# Patient Record
Sex: MALE | Employment: UNEMPLOYED | URBAN - METROPOLITAN AREA
[De-identification: names, ages, dates, MRNs, and addresses within clinical notes are randomized per-mention and may not be internally consistent; named-entity substitution may affect disease eponyms.]

---

## 2017-07-29 PROBLEM — R03.0 ELEVATED BLOOD PRESSURE READING: Status: ACTIVE | Noted: 2017-04-14

## 2018-05-14 ENCOUNTER — HOSPITAL ENCOUNTER (OUTPATIENT)
Age: 38
Discharge: HOME | End: 2018-05-16

## 2018-05-14 ENCOUNTER — OFFICE VISIT (OUTPATIENT)
Dept: FAMILY MEDICINE CLINIC | Age: 38
End: 2018-05-14

## 2018-05-14 VITALS
SYSTOLIC BLOOD PRESSURE: 138 MMHG | RESPIRATION RATE: 12 BRPM | WEIGHT: 215 LBS | HEART RATE: 62 BPM | HEIGHT: 65 IN | OXYGEN SATURATION: 98 % | BODY MASS INDEX: 35.82 KG/M2 | TEMPERATURE: 98.3 F | DIASTOLIC BLOOD PRESSURE: 83 MMHG

## 2018-05-14 DIAGNOSIS — E66.9 OBESITY, UNSPECIFIED CLASSIFICATION, UNSPECIFIED OBESITY TYPE, UNSPECIFIED WHETHER SERIOUS COMORBIDITY PRESENT: ICD-10-CM

## 2018-05-14 DIAGNOSIS — I10 ESSENTIAL HYPERTENSION: Primary | ICD-10-CM

## 2018-05-14 DIAGNOSIS — Z83.3 FAMILY HISTORY OF DIABETES MELLITUS: ICD-10-CM

## 2018-05-14 DIAGNOSIS — I10 ESSENTIAL HYPERTENSION: ICD-10-CM

## 2018-05-14 DIAGNOSIS — Z11.4 ENCOUNTER FOR SCREENING FOR HIV: ICD-10-CM

## 2018-05-14 LAB
ANION GAP SERPL CALCULATED.3IONS-SCNC: 16 MMOL/L (ref 7–16)
BILIRUBIN URINE: NEGATIVE
BLOOD, URINE: NEGATIVE
BUN BLDV-MCNC: 15 MG/DL (ref 6–20)
CALCIUM SERPL-MCNC: 9.3 MG/DL (ref 8.6–10.2)
CHLORIDE BLD-SCNC: 100 MMOL/L (ref 98–107)
CHOLESTEROL, TOTAL: 221 MG/DL (ref 0–199)
CLARITY: CLEAR
CO2: 22 MMOL/L (ref 22–29)
COLOR: YELLOW
CREAT SERPL-MCNC: 0.7 MG/DL (ref 0.7–1.2)
GFR AFRICAN AMERICAN: >60
GFR NON-AFRICAN AMERICAN: >60 ML/MIN/1.73
GLUCOSE BLD-MCNC: 85 MG/DL (ref 74–109)
GLUCOSE URINE: NEGATIVE MG/DL
HBA1C MFR BLD: 5.8 % (ref 4.8–5.9)
HDLC SERPL-MCNC: 37 MG/DL
KETONES, URINE: NEGATIVE MG/DL
LDL CHOLESTEROL CALCULATED: 132 MG/DL (ref 0–99)
LEUKOCYTE ESTERASE, URINE: NEGATIVE
NITRITE, URINE: NEGATIVE
PH UA: 5.5 (ref 5–9)
POTASSIUM SERPL-SCNC: 4.4 MMOL/L (ref 3.5–5)
PROTEIN UA: NEGATIVE MG/DL
SODIUM BLD-SCNC: 138 MMOL/L (ref 132–146)
SPECIFIC GRAVITY UA: 1.02 (ref 1–1.03)
TRIGL SERPL-MCNC: 258 MG/DL (ref 0–149)
TSH SERPL DL<=0.05 MIU/L-ACNC: 2.49 UIU/ML (ref 0.27–4.2)
UROBILINOGEN, URINE: 0.2 E.U./DL
VLDLC SERPL CALC-MCNC: 52 MG/DL

## 2018-05-14 PROCEDURE — 99212 OFFICE O/P EST SF 10 MIN: CPT | Performed by: FAMILY MEDICINE

## 2018-05-14 PROCEDURE — 84443 ASSAY THYROID STIM HORMONE: CPT

## 2018-05-14 PROCEDURE — 83036 HEMOGLOBIN GLYCOSYLATED A1C: CPT

## 2018-05-14 PROCEDURE — 80061 LIPID PANEL: CPT

## 2018-05-14 PROCEDURE — 80048 BASIC METABOLIC PNL TOTAL CA: CPT

## 2018-05-14 PROCEDURE — 81003 URINALYSIS AUTO W/O SCOPE: CPT

## 2018-05-14 PROCEDURE — 99213 OFFICE O/P EST LOW 20 MIN: CPT | Performed by: FAMILY MEDICINE

## 2018-05-14 PROCEDURE — 36415 COLL VENOUS BLD VENIPUNCTURE: CPT | Performed by: FAMILY MEDICINE

## 2018-05-14 ASSESSMENT — PATIENT HEALTH QUESTIONNAIRE - PHQ9
SUM OF ALL RESPONSES TO PHQ QUESTIONS 1-9: 0
1. LITTLE INTEREST OR PLEASURE IN DOING THINGS: 0
2. FEELING DOWN, DEPRESSED OR HOPELESS: 0
SUM OF ALL RESPONSES TO PHQ9 QUESTIONS 1 & 2: 0

## 2018-05-14 ASSESSMENT — ENCOUNTER SYMPTOMS
ABDOMINAL PAIN: 0
DIARRHEA: 0
NAUSEA: 0
CONSTIPATION: 0
VOMITING: 0
BLURRED VISION: 0
DOUBLE VISION: 0
SORE THROAT: 0
BACK PAIN: 0
COUGH: 0
SHORTNESS OF BREATH: 0

## 2018-10-10 ENCOUNTER — HOSPITAL ENCOUNTER (EMERGENCY)
Age: 38
Discharge: HOME | End: 2018-10-11

## 2018-10-10 DIAGNOSIS — R11.2 NON-INTRACTABLE VOMITING WITH NAUSEA, UNSPECIFIED VOMITING TYPE: Primary | ICD-10-CM

## 2018-10-10 DIAGNOSIS — R10.31 RIGHT LOWER QUADRANT ABDOMINAL PAIN: ICD-10-CM

## 2018-10-10 LAB
ALBUMIN SERPL-MCNC: 4.7 G/DL (ref 3.5–5.2)
ALP BLD-CCNC: 90 U/L (ref 40–129)
ALT SERPL-CCNC: 79 U/L (ref 0–40)
ANION GAP SERPL CALCULATED.3IONS-SCNC: 15 MMOL/L (ref 7–16)
AST SERPL-CCNC: 41 U/L (ref 0–39)
BACTERIA: NORMAL /HPF
BASOPHILS ABSOLUTE: 0.04 E9/L (ref 0–0.2)
BASOPHILS RELATIVE PERCENT: 0.3 % (ref 0–2)
BILIRUB SERPL-MCNC: 0.9 MG/DL (ref 0–1.2)
BILIRUBIN URINE: NEGATIVE
BLOOD, URINE: ABNORMAL
BUN BLDV-MCNC: 23 MG/DL (ref 6–20)
CALCIUM SERPL-MCNC: 9.8 MG/DL (ref 8.6–10.2)
CHLORIDE BLD-SCNC: 102 MMOL/L (ref 98–107)
CLARITY: CLEAR
CO2: 23 MMOL/L (ref 22–29)
COLOR: YELLOW
CREAT SERPL-MCNC: 0.7 MG/DL (ref 0.7–1.2)
EOSINOPHILS ABSOLUTE: 0.08 E9/L (ref 0.05–0.5)
EOSINOPHILS RELATIVE PERCENT: 0.7 % (ref 0–6)
EPITHELIAL CELLS, UA: NORMAL /HPF
GFR AFRICAN AMERICAN: >60
GFR NON-AFRICAN AMERICAN: >60 ML/MIN/1.73
GLUCOSE BLD-MCNC: 111 MG/DL (ref 74–109)
GLUCOSE URINE: NEGATIVE MG/DL
HCT VFR BLD CALC: 43.2 % (ref 37–54)
HEMOGLOBIN: 14.8 G/DL (ref 12.5–16.5)
IMMATURE GRANULOCYTES #: 0.05 E9/L
IMMATURE GRANULOCYTES %: 0.4 % (ref 0–5)
KETONES, URINE: NEGATIVE MG/DL
LACTIC ACID: 1 MMOL/L (ref 0.5–2.2)
LEUKOCYTE ESTERASE, URINE: NEGATIVE
LYMPHOCYTES ABSOLUTE: 1.74 E9/L (ref 1.5–4)
LYMPHOCYTES RELATIVE PERCENT: 14.7 % (ref 20–42)
MCH RBC QN AUTO: 30.3 PG (ref 26–35)
MCHC RBC AUTO-ENTMCNC: 34.3 % (ref 32–34.5)
MCV RBC AUTO: 88.5 FL (ref 80–99.9)
MONOCYTES ABSOLUTE: 0.94 E9/L (ref 0.1–0.95)
MONOCYTES RELATIVE PERCENT: 7.9 % (ref 2–12)
NEUTROPHILS ABSOLUTE: 8.99 E9/L (ref 1.8–7.3)
NEUTROPHILS RELATIVE PERCENT: 76 % (ref 43–80)
NITRITE, URINE: NEGATIVE
PDW BLD-RTO: 11.9 FL (ref 11.5–15)
PH UA: 6 (ref 5–9)
PLATELET # BLD: 175 E9/L (ref 130–450)
PMV BLD AUTO: 11.6 FL (ref 7–12)
POTASSIUM SERPL-SCNC: 3.9 MMOL/L (ref 3.5–5)
PROTEIN UA: NEGATIVE MG/DL
RBC # BLD: 4.88 E12/L (ref 3.8–5.8)
RBC UA: NORMAL /HPF (ref 0–2)
SODIUM BLD-SCNC: 140 MMOL/L (ref 132–146)
SPECIFIC GRAVITY UA: >=1.03 (ref 1–1.03)
TOTAL PROTEIN: 7.7 G/DL (ref 6.4–8.3)
UROBILINOGEN, URINE: 0.2 E.U./DL
WBC # BLD: 11.8 E9/L (ref 4.5–11.5)
WBC UA: NORMAL /HPF (ref 0–5)

## 2018-10-10 PROCEDURE — 2580000003 HC RX 258: Performed by: PHYSICIAN ASSISTANT

## 2018-10-10 PROCEDURE — 6360000002 HC RX W HCPCS: Performed by: PHYSICIAN ASSISTANT

## 2018-10-10 PROCEDURE — 96374 THER/PROPH/DIAG INJ IV PUSH: CPT

## 2018-10-10 PROCEDURE — 36415 COLL VENOUS BLD VENIPUNCTURE: CPT

## 2018-10-10 PROCEDURE — 85025 COMPLETE CBC W/AUTO DIFF WBC: CPT

## 2018-10-10 PROCEDURE — 96375 TX/PRO/DX INJ NEW DRUG ADDON: CPT

## 2018-10-10 PROCEDURE — 83605 ASSAY OF LACTIC ACID: CPT

## 2018-10-10 PROCEDURE — 80053 COMPREHEN METABOLIC PANEL: CPT

## 2018-10-10 PROCEDURE — 99284 EMERGENCY DEPT VISIT MOD MDM: CPT

## 2018-10-10 PROCEDURE — 81001 URINALYSIS AUTO W/SCOPE: CPT

## 2018-10-10 RX ORDER — MORPHINE SULFATE 4 MG/ML
4 INJECTION, SOLUTION INTRAMUSCULAR; INTRAVENOUS ONCE
Status: COMPLETED | OUTPATIENT
Start: 2018-10-10 | End: 2018-10-10

## 2018-10-10 RX ORDER — ONDANSETRON 2 MG/ML
4 INJECTION INTRAMUSCULAR; INTRAVENOUS ONCE
Status: COMPLETED | OUTPATIENT
Start: 2018-10-10 | End: 2018-10-10

## 2018-10-10 RX ORDER — 0.9 % SODIUM CHLORIDE 0.9 %
1000 INTRAVENOUS SOLUTION INTRAVENOUS ONCE
Status: COMPLETED | OUTPATIENT
Start: 2018-10-10 | End: 2018-10-11

## 2018-10-10 RX ORDER — KETOROLAC TROMETHAMINE 30 MG/ML
30 INJECTION, SOLUTION INTRAMUSCULAR; INTRAVENOUS ONCE
Status: COMPLETED | OUTPATIENT
Start: 2018-10-10 | End: 2018-10-10

## 2018-10-10 RX ADMIN — MORPHINE SULFATE 4 MG: 4 INJECTION INTRAVENOUS at 23:58

## 2018-10-10 RX ADMIN — SODIUM CHLORIDE 1000 ML: 9 INJECTION, SOLUTION INTRAVENOUS at 23:23

## 2018-10-10 RX ADMIN — KETOROLAC TROMETHAMINE 30 MG: 30 INJECTION, SOLUTION INTRAMUSCULAR; INTRAVENOUS at 23:22

## 2018-10-10 RX ADMIN — ONDANSETRON 4 MG: 2 INJECTION INTRAMUSCULAR; INTRAVENOUS at 23:22

## 2018-10-10 ASSESSMENT — PAIN SCALES - GENERAL
PAINLEVEL_OUTOF10: 10
PAINLEVEL_OUTOF10: 9
PAINLEVEL_OUTOF10: 6

## 2018-10-10 ASSESSMENT — PAIN DESCRIPTION - PAIN TYPE: TYPE: ACUTE PAIN

## 2018-10-10 ASSESSMENT — PAIN DESCRIPTION - LOCATION: LOCATION: FLANK

## 2018-10-10 ASSESSMENT — PAIN DESCRIPTION - ORIENTATION: ORIENTATION: RIGHT

## 2018-10-11 ENCOUNTER — ANESTHESIA EVENT (OUTPATIENT)
Dept: OPERATING ROOM | Age: 38
DRG: 343 | End: 2018-10-11

## 2018-10-11 ENCOUNTER — APPOINTMENT (OUTPATIENT)
Dept: CT IMAGING | Age: 38
End: 2018-10-11

## 2018-10-11 ENCOUNTER — HOSPITAL ENCOUNTER (INPATIENT)
Age: 38
LOS: 1 days | Discharge: HOME | DRG: 343 | End: 2018-10-11
Attending: SURGERY | Admitting: SURGERY

## 2018-10-11 ENCOUNTER — ANESTHESIA (OUTPATIENT)
Dept: OPERATING ROOM | Age: 38
DRG: 343 | End: 2018-10-11

## 2018-10-11 VITALS
DIASTOLIC BLOOD PRESSURE: 63 MMHG | OXYGEN SATURATION: 98 % | RESPIRATION RATE: 2 BRPM | SYSTOLIC BLOOD PRESSURE: 105 MMHG

## 2018-10-11 VITALS
BODY MASS INDEX: 29.99 KG/M2 | TEMPERATURE: 98.9 F | SYSTOLIC BLOOD PRESSURE: 112 MMHG | HEIGHT: 65 IN | RESPIRATION RATE: 16 BRPM | DIASTOLIC BLOOD PRESSURE: 59 MMHG | OXYGEN SATURATION: 94 % | WEIGHT: 180 LBS | HEART RATE: 70 BPM

## 2018-10-11 VITALS
BODY MASS INDEX: 29.99 KG/M2 | OXYGEN SATURATION: 93 % | HEIGHT: 65 IN | TEMPERATURE: 98.6 F | WEIGHT: 180 LBS | RESPIRATION RATE: 16 BRPM | DIASTOLIC BLOOD PRESSURE: 58 MMHG | SYSTOLIC BLOOD PRESSURE: 111 MMHG | HEART RATE: 55 BPM

## 2018-10-11 DIAGNOSIS — K35.80 ACUTE APPENDICITIS, UNSPECIFIED ACUTE APPENDICITIS TYPE: Primary | ICD-10-CM

## 2018-10-11 PROBLEM — K37 APPENDICITIS: Status: ACTIVE | Noted: 2018-10-11

## 2018-10-11 PROCEDURE — 0DTJ4ZZ RESECTION OF APPENDIX, PERCUTANEOUS ENDOSCOPIC APPROACH: ICD-10-PCS | Performed by: SURGERY

## 2018-10-11 PROCEDURE — 6360000002 HC RX W HCPCS

## 2018-10-11 PROCEDURE — 6360000002 HC RX W HCPCS: Performed by: SURGERY

## 2018-10-11 PROCEDURE — 7100000001 HC PACU RECOVERY - ADDTL 15 MIN: Performed by: SURGERY

## 2018-10-11 PROCEDURE — 44970 LAPAROSCOPY APPENDECTOMY: CPT | Performed by: SURGERY

## 2018-10-11 PROCEDURE — 88304 TISSUE EXAM BY PATHOLOGIST: CPT

## 2018-10-11 PROCEDURE — 2780000010 HC IMPLANT OTHER: Performed by: SURGERY

## 2018-10-11 PROCEDURE — 6360000002 HC RX W HCPCS: Performed by: NURSE ANESTHETIST, CERTIFIED REGISTERED

## 2018-10-11 PROCEDURE — 6360000002 HC RX W HCPCS: Performed by: ANESTHESIOLOGY

## 2018-10-11 PROCEDURE — 99284 EMERGENCY DEPT VISIT MOD MDM: CPT

## 2018-10-11 PROCEDURE — 74178 CT ABD&PLV WO CNTR FLWD CNTR: CPT

## 2018-10-11 PROCEDURE — 99219 PR INITIAL OBSERVATION CARE/DAY 50 MINUTES: CPT | Performed by: SURGERY

## 2018-10-11 PROCEDURE — 6370000000 HC RX 637 (ALT 250 FOR IP): Performed by: SURGERY

## 2018-10-11 PROCEDURE — 2500000003 HC RX 250 WO HCPCS: Performed by: SURGERY

## 2018-10-11 PROCEDURE — 3600000004 HC SURGERY LEVEL 4 BASE: Performed by: SURGERY

## 2018-10-11 PROCEDURE — 6360000004 HC RX CONTRAST MEDICATION: Performed by: RADIOLOGY

## 2018-10-11 PROCEDURE — 3600000014 HC SURGERY LEVEL 4 ADDTL 15MIN: Performed by: SURGERY

## 2018-10-11 PROCEDURE — 2500000003 HC RX 250 WO HCPCS

## 2018-10-11 PROCEDURE — 7100000000 HC PACU RECOVERY - FIRST 15 MIN: Performed by: SURGERY

## 2018-10-11 PROCEDURE — 2500000003 HC RX 250 WO HCPCS: Performed by: NURSE ANESTHETIST, CERTIFIED REGISTERED

## 2018-10-11 PROCEDURE — 2580000003 HC RX 258: Performed by: SURGERY

## 2018-10-11 PROCEDURE — 1200000000 HC SEMI PRIVATE

## 2018-10-11 PROCEDURE — 2720000010 HC SURG SUPPLY STERILE: Performed by: SURGERY

## 2018-10-11 PROCEDURE — 3700000001 HC ADD 15 MINUTES (ANESTHESIA): Performed by: SURGERY

## 2018-10-11 PROCEDURE — 3700000000 HC ANESTHESIA ATTENDED CARE: Performed by: SURGERY

## 2018-10-11 PROCEDURE — 2580000003 HC RX 258: Performed by: NURSE ANESTHETIST, CERTIFIED REGISTERED

## 2018-10-11 PROCEDURE — 2709999900 HC NON-CHARGEABLE SUPPLY: Performed by: SURGERY

## 2018-10-11 RX ORDER — MIDAZOLAM HYDROCHLORIDE 1 MG/ML
INJECTION INTRAMUSCULAR; INTRAVENOUS PRN
Status: DISCONTINUED | OUTPATIENT
Start: 2018-10-11 | End: 2018-10-11 | Stop reason: SDUPTHER

## 2018-10-11 RX ORDER — OXYCODONE HYDROCHLORIDE AND ACETAMINOPHEN 5; 325 MG/1; MG/1
1 TABLET ORAL EVERY 4 HOURS PRN
Status: DISCONTINUED | OUTPATIENT
Start: 2018-10-11 | End: 2018-10-11 | Stop reason: HOSPADM

## 2018-10-11 RX ORDER — MORPHINE SULFATE 4 MG/ML
2 INJECTION, SOLUTION INTRAMUSCULAR; INTRAVENOUS
Status: DISCONTINUED | OUTPATIENT
Start: 2018-10-11 | End: 2018-10-11 | Stop reason: HOSPADM

## 2018-10-11 RX ORDER — FENTANYL CITRATE 50 UG/ML
25 INJECTION, SOLUTION INTRAMUSCULAR; INTRAVENOUS EVERY 5 MIN PRN
Status: COMPLETED | OUTPATIENT
Start: 2018-10-11 | End: 2018-10-11

## 2018-10-11 RX ORDER — BUPIVACAINE HYDROCHLORIDE AND EPINEPHRINE 2.5; 5 MG/ML; UG/ML
INJECTION, SOLUTION EPIDURAL; INFILTRATION; INTRACAUDAL; PERINEURAL PRN
Status: DISCONTINUED | OUTPATIENT
Start: 2018-10-11 | End: 2018-10-11 | Stop reason: HOSPADM

## 2018-10-11 RX ORDER — MORPHINE SULFATE 4 MG/ML
4 INJECTION, SOLUTION INTRAMUSCULAR; INTRAVENOUS
Status: DISCONTINUED | OUTPATIENT
Start: 2018-10-11 | End: 2018-10-11 | Stop reason: HOSPADM

## 2018-10-11 RX ORDER — GLYCOPYRROLATE 1 MG/5 ML
SYRINGE (ML) INTRAVENOUS PRN
Status: DISCONTINUED | OUTPATIENT
Start: 2018-10-11 | End: 2018-10-11 | Stop reason: SDUPTHER

## 2018-10-11 RX ORDER — CIPROFLOXACIN 2 MG/ML
400 INJECTION, SOLUTION INTRAVENOUS EVERY 12 HOURS
Status: DISCONTINUED | OUTPATIENT
Start: 2018-10-11 | End: 2018-10-11

## 2018-10-11 RX ORDER — DICYCLOMINE HYDROCHLORIDE 10 MG/1
20 CAPSULE ORAL
Qty: 15 CAPSULE | Refills: 0 | Status: SHIPPED | OUTPATIENT
Start: 2018-10-11 | End: 2018-10-11 | Stop reason: ALTCHOICE

## 2018-10-11 RX ORDER — FENTANYL CITRATE 50 UG/ML
50 INJECTION, SOLUTION INTRAMUSCULAR; INTRAVENOUS EVERY 5 MIN PRN
Status: DISCONTINUED | OUTPATIENT
Start: 2018-10-11 | End: 2018-10-11 | Stop reason: HOSPADM

## 2018-10-11 RX ORDER — SODIUM CHLORIDE, SODIUM LACTATE, POTASSIUM CHLORIDE, CALCIUM CHLORIDE 600; 310; 30; 20 MG/100ML; MG/100ML; MG/100ML; MG/100ML
INJECTION, SOLUTION INTRAVENOUS CONTINUOUS
Status: DISCONTINUED | OUTPATIENT
Start: 2018-10-11 | End: 2018-10-11 | Stop reason: HOSPADM

## 2018-10-11 RX ORDER — ONDANSETRON 4 MG/1
4 TABLET, ORALLY DISINTEGRATING ORAL EVERY 8 HOURS PRN
Qty: 10 TABLET | Refills: 0 | Status: SHIPPED | OUTPATIENT
Start: 2018-10-11 | End: 2018-10-11 | Stop reason: ALTCHOICE

## 2018-10-11 RX ORDER — SODIUM CHLORIDE 0.9 % (FLUSH) 0.9 %
10 SYRINGE (ML) INJECTION PRN
Status: DISCONTINUED | OUTPATIENT
Start: 2018-10-11 | End: 2018-10-11 | Stop reason: HOSPADM

## 2018-10-11 RX ORDER — CIPROFLOXACIN 2 MG/ML
INJECTION, SOLUTION INTRAVENOUS
Status: COMPLETED
Start: 2018-10-11 | End: 2018-10-11

## 2018-10-11 RX ORDER — IBUPROFEN 800 MG/1
800 TABLET ORAL EVERY 6 HOURS PRN
Qty: 20 TABLET | Refills: 0 | Status: SHIPPED | OUTPATIENT
Start: 2018-10-11 | End: 2022-07-28

## 2018-10-11 RX ORDER — DEXAMETHASONE SODIUM PHOSPHATE 10 MG/ML
INJECTION INTRAMUSCULAR; INTRAVENOUS PRN
Status: DISCONTINUED | OUTPATIENT
Start: 2018-10-11 | End: 2018-10-11 | Stop reason: SDUPTHER

## 2018-10-11 RX ORDER — OXYCODONE HYDROCHLORIDE AND ACETAMINOPHEN 5; 325 MG/1; MG/1
1 TABLET ORAL EVERY 6 HOURS PRN
Qty: 20 TABLET | Refills: 0 | Status: SHIPPED | OUTPATIENT
Start: 2018-10-11 | End: 2018-10-16

## 2018-10-11 RX ORDER — SODIUM CHLORIDE 0.9 % (FLUSH) 0.9 %
10 SYRINGE (ML) INJECTION EVERY 12 HOURS SCHEDULED
Status: DISCONTINUED | OUTPATIENT
Start: 2018-10-11 | End: 2018-10-11 | Stop reason: HOSPADM

## 2018-10-11 RX ORDER — ONDANSETRON 2 MG/ML
4 INJECTION INTRAMUSCULAR; INTRAVENOUS EVERY 6 HOURS PRN
Status: DISCONTINUED | OUTPATIENT
Start: 2018-10-11 | End: 2018-10-11 | Stop reason: HOSPADM

## 2018-10-11 RX ORDER — ONDANSETRON 2 MG/ML
4 INJECTION INTRAMUSCULAR; INTRAVENOUS
Status: DISCONTINUED | OUTPATIENT
Start: 2018-10-11 | End: 2018-10-11 | Stop reason: HOSPADM

## 2018-10-11 RX ORDER — SODIUM CHLORIDE, SODIUM LACTATE, POTASSIUM CHLORIDE, CALCIUM CHLORIDE 600; 310; 30; 20 MG/100ML; MG/100ML; MG/100ML; MG/100ML
INJECTION, SOLUTION INTRAVENOUS CONTINUOUS PRN
Status: DISCONTINUED | OUTPATIENT
Start: 2018-10-11 | End: 2018-10-11 | Stop reason: SDUPTHER

## 2018-10-11 RX ORDER — KETOROLAC TROMETHAMINE 30 MG/ML
INJECTION, SOLUTION INTRAMUSCULAR; INTRAVENOUS PRN
Status: DISCONTINUED | OUTPATIENT
Start: 2018-10-11 | End: 2018-10-11 | Stop reason: SDUPTHER

## 2018-10-11 RX ORDER — KETOROLAC TROMETHAMINE 30 MG/ML
30 INJECTION, SOLUTION INTRAMUSCULAR; INTRAVENOUS EVERY 6 HOURS PRN
Status: DISCONTINUED | OUTPATIENT
Start: 2018-10-11 | End: 2018-10-11 | Stop reason: HOSPADM

## 2018-10-11 RX ORDER — ACETAMINOPHEN 325 MG/1
650 TABLET ORAL EVERY 4 HOURS PRN
Status: DISCONTINUED | OUTPATIENT
Start: 2018-10-11 | End: 2018-10-11 | Stop reason: HOSPADM

## 2018-10-11 RX ORDER — FENTANYL CITRATE 50 UG/ML
INJECTION, SOLUTION INTRAMUSCULAR; INTRAVENOUS
Status: COMPLETED
Start: 2018-10-11 | End: 2018-10-11

## 2018-10-11 RX ORDER — PROPOFOL 10 MG/ML
INJECTION, EMULSION INTRAVENOUS PRN
Status: DISCONTINUED | OUTPATIENT
Start: 2018-10-11 | End: 2018-10-11 | Stop reason: SDUPTHER

## 2018-10-11 RX ORDER — ROCURONIUM BROMIDE 10 MG/ML
INJECTION, SOLUTION INTRAVENOUS PRN
Status: DISCONTINUED | OUTPATIENT
Start: 2018-10-11 | End: 2018-10-11 | Stop reason: SDUPTHER

## 2018-10-11 RX ADMIN — FENTANYL CITRATE 25 MCG: 50 INJECTION INTRAMUSCULAR; INTRAVENOUS at 14:03

## 2018-10-11 RX ADMIN — FENTANYL CITRATE 25 MCG: 50 INJECTION INTRAMUSCULAR; INTRAVENOUS at 13:55

## 2018-10-11 RX ADMIN — SODIUM CHLORIDE, POTASSIUM CHLORIDE, SODIUM LACTATE AND CALCIUM CHLORIDE: 600; 310; 30; 20 INJECTION, SOLUTION INTRAVENOUS at 15:53

## 2018-10-11 RX ADMIN — METRONIDAZOLE 500 MG: 500 INJECTION, SOLUTION INTRAVENOUS at 11:38

## 2018-10-11 RX ADMIN — OXYCODONE HYDROCHLORIDE AND ACETAMINOPHEN 1 TABLET: 5; 325 TABLET ORAL at 19:17

## 2018-10-11 RX ADMIN — FENTANYL CITRATE 25 MCG: 50 INJECTION INTRAMUSCULAR; INTRAVENOUS at 14:08

## 2018-10-11 RX ADMIN — OXYCODONE HYDROCHLORIDE AND ACETAMINOPHEN 1 TABLET: 5; 325 TABLET ORAL at 15:51

## 2018-10-11 RX ADMIN — ROCURONIUM BROMIDE 30 MG: 10 INJECTION, SOLUTION INTRAVENOUS at 12:57

## 2018-10-11 RX ADMIN — MIDAZOLAM 2 MG: 1 INJECTION INTRAMUSCULAR; INTRAVENOUS at 12:52

## 2018-10-11 RX ADMIN — FENTANYL CITRATE 100 MCG: 50 INJECTION, SOLUTION INTRAMUSCULAR; INTRAVENOUS at 12:56

## 2018-10-11 RX ADMIN — FENTANYL CITRATE 25 MCG: 50 INJECTION INTRAMUSCULAR; INTRAVENOUS at 13:50

## 2018-10-11 RX ADMIN — ONDANSETRON 4 MG: 2 INJECTION INTRAMUSCULAR; INTRAVENOUS at 13:11

## 2018-10-11 RX ADMIN — SODIUM CHLORIDE, POTASSIUM CHLORIDE, SODIUM LACTATE AND CALCIUM CHLORIDE: 600; 310; 30; 20 INJECTION, SOLUTION INTRAVENOUS at 12:50

## 2018-10-11 RX ADMIN — KETOROLAC TROMETHAMINE 30 MG: 30 INJECTION, SOLUTION INTRAMUSCULAR; INTRAVENOUS at 13:20

## 2018-10-11 RX ADMIN — MORPHINE SULFATE 4 MG: 4 INJECTION INTRAVENOUS at 16:38

## 2018-10-11 RX ADMIN — Medication 0.2 MG: at 13:09

## 2018-10-11 RX ADMIN — DEXAMETHASONE SODIUM PHOSPHATE 10 MG: 10 INJECTION INTRAMUSCULAR; INTRAVENOUS at 13:08

## 2018-10-11 RX ADMIN — PROPOFOL 200 MG: 10 INJECTION, EMULSION INTRAVENOUS at 12:56

## 2018-10-11 RX ADMIN — IOPAMIDOL 80 ML: 755 INJECTION, SOLUTION INTRAVENOUS at 00:27

## 2018-10-11 RX ADMIN — SODIUM CHLORIDE, POTASSIUM CHLORIDE, SODIUM LACTATE AND CALCIUM CHLORIDE: 600; 310; 30; 20 INJECTION, SOLUTION INTRAVENOUS at 13:36

## 2018-10-11 RX ADMIN — CIPROFLOXACIN 400 MG: 2 INJECTION, SOLUTION INTRAVENOUS at 13:03

## 2018-10-11 ASSESSMENT — PULMONARY FUNCTION TESTS
PIF_VALUE: 25
PIF_VALUE: 28
PIF_VALUE: 41
PIF_VALUE: 25
PIF_VALUE: 28
PIF_VALUE: 27
PIF_VALUE: 19
PIF_VALUE: 3
PIF_VALUE: 19
PIF_VALUE: 28
PIF_VALUE: 5
PIF_VALUE: 20
PIF_VALUE: 28
PIF_VALUE: 39
PIF_VALUE: 22
PIF_VALUE: 27
PIF_VALUE: 7
PIF_VALUE: 27
PIF_VALUE: 27
PIF_VALUE: 7
PIF_VALUE: 28
PIF_VALUE: 21
PIF_VALUE: 26
PIF_VALUE: 20
PIF_VALUE: 29
PIF_VALUE: 27
PIF_VALUE: 7
PIF_VALUE: 27
PIF_VALUE: 22
PIF_VALUE: 28
PIF_VALUE: 3
PIF_VALUE: 20
PIF_VALUE: 32
PIF_VALUE: 5
PIF_VALUE: 22

## 2018-10-11 ASSESSMENT — PAIN DESCRIPTION - LOCATION
LOCATION: ABDOMEN

## 2018-10-11 ASSESSMENT — PAIN SCALES - GENERAL
PAINLEVEL_OUTOF10: 0
PAINLEVEL_OUTOF10: 6
PAINLEVEL_OUTOF10: 5
PAINLEVEL_OUTOF10: 4
PAINLEVEL_OUTOF10: 5
PAINLEVEL_OUTOF10: 2
PAINLEVEL_OUTOF10: 10
PAINLEVEL_OUTOF10: 3
PAINLEVEL_OUTOF10: 7
PAINLEVEL_OUTOF10: 4
PAINLEVEL_OUTOF10: 7

## 2018-10-11 ASSESSMENT — PAIN DESCRIPTION - PAIN TYPE
TYPE: ACUTE PAIN
TYPE: SURGICAL PAIN
TYPE: ACUTE PAIN
TYPE: SURGICAL PAIN

## 2018-10-11 ASSESSMENT — PAIN DESCRIPTION - ORIENTATION
ORIENTATION: RIGHT;MID
ORIENTATION: RIGHT;LEFT
ORIENTATION: LOWER;RIGHT

## 2018-10-11 ASSESSMENT — LIFESTYLE VARIABLES: SMOKING_STATUS: 1

## 2018-10-11 ASSESSMENT — PAIN DESCRIPTION - DESCRIPTORS
DESCRIPTORS: ACHING;BURNING
DESCRIPTORS: ACHING
DESCRIPTORS: ACHING;BURNING

## 2018-10-11 ASSESSMENT — PAIN DESCRIPTION - PROGRESSION
CLINICAL_PROGRESSION: NOT CHANGED
CLINICAL_PROGRESSION: GRADUALLY IMPROVING

## 2018-10-11 NOTE — OP NOTE
between the mesoappendix and the appendix itself. We then used a white load on the 45mm endostapler to transect the mesoappendix. A 45mm endostapler blue load was then used to staple across the base of the appendix, and it was transected. Both of these were done without difficulty, and they were done under direct visualization and with laparoscopic camera. At this point we placed the appendix in a laparoscopic bag and it was retracted through the 10 mm port. We then inspected the base of the cecum which appeared to be hemostatic. We then withdrew our ports under direct visualization, inspected them, and they appeared to be hemostatic. We decompressed the abdomen and retracted our 5 mm camera and our 10 mm port. We then placed a single figure-of-8 interrupted 0 Vicryl suture in the 10 mm port site to reapproximate the fascia, and then close the skin with 4-0 Monocryl suture in a simple interrupted fashion. All 3 port sites were then covered with Dermabond. The patient was awoken in the operating room theater without any difficulty. The patient was extubated without difficulty and transferred to the postoperative care unit. All instrument counts, lap counts, and needle counts were correct at the completion of the procedure.           Electronically signed by Mario Black  10/11/2018  1:00 PM

## 2018-10-11 NOTE — ANESTHESIA PRE PROCEDURE
Department of Anesthesiology  Preprocedure Note       Name:  Sami Olivia   Age:  43 y.o.  :  1980                                          MRN:  [id number]         Date:  10/11/2018      Surgeon: Noy Cerna):  Marylee Ceo, MD    Procedure: Procedure(s):  LAPAROSCOPIC APPENDECTOMY    Medications prior to admission:   Prior to Admission medications    Not on File       Current medications:    Current Facility-Administered Medications   Medication Dose Route Frequency Provider Last Rate Last Dose    sodium chloride flush 0.9 % injection 10 mL  10 mL Intravenous 2 times per day Marylee Ceo, MD        sodium chloride flush 0.9 % injection 10 mL  10 mL Intravenous PRN Marylee Ceo, MD        acetaminophen (TYLENOL) tablet 650 mg  650 mg Oral Q4H PRN Marylee Ceo, MD        ondansetron [facility]) injection 4 mg  4 mg Intravenous Q6H PRN Marylee Ceo, MD  Jacquelynne Inch ON 10/12/2018] enoxaparin (LOVENOX) injection 40 mg  40 mg Subcutaneous Daily Marylee Ceo, MD        lactated ringers infusion   Intravenous Continuous Marylee Ceo, MD        morphine (PF) injection 2 mg  2 mg Intravenous Q2H PRN Marylee Ceo, MD        Or    morphine (PF) injection 4 mg  4 mg Intravenous Q2H PRN Marylee Ceo, MD        ciprofloxacin (CIPRO) IVPB 400 mg  400 mg Intravenous Q12H Marylee Ceo, MD        metronidazole (FLAGYL) 500 mg in NaCl 100 mL IVPB premix  500 mg Intravenous Q8H Marylee Ceo, MD           Allergies:  No Known Allergies    Problem List:    Patient Active Problem List   Diagnosis Code    Appendicitis K37       Past Medical History:  History reviewed. No pertinent past medical history.     Past Surgical History:        Procedure Laterality Date    ANTERIOR CRUCIATE LIGAMENT REPAIR Right     Right knee       Social History:    Social History   Substance Use Topics    Smoking status: Current Some Day Smoker     Types: Cigarettes    Smokeless tobacco: Never Used    Alcohol

## 2018-10-11 NOTE — H&P
Carine Power  10/11/2018  [facility]  History and Physical  Florentino Diego MD, MS    CHIEF COMPLAINT: Right lower abdominal pain. HISTORY OF PRESENT ILLNESS: Carine Power is a  43 y.o.  male, who weighs Weight: 180 lb (81.6 kg). He has been having lower abdominal pain for 1 days. Labs show the WBC to be 11.8. CT of the abdomen and pelvis showed acute appendicitis. We also discussed the different  medical and surgical options. He wishes to proceed with appendectomy. He states that he understands all of these risks and wishes to proceed. Patient has no known allergies. History reviewed. No pertinent past medical history.    Past Surgical History:   Procedure Laterality Date    ANTERIOR CRUCIATE LIGAMENT REPAIR Right     Right knee        Current Facility-Administered Medications   Medication Dose Route Frequency Provider Last Rate Last Dose    sodium chloride flush 0.9 % injection 10 mL  10 mL Intravenous 2 times per day Manolo Milligan MD        sodium chloride flush 0.9 % injection 10 mL  10 mL Intravenous PRN Manolo Milligan MD        acetaminophen (TYLENOL) tablet 650 mg  650 mg Oral Q4H PRN Manolo Milligan MD        ondansetron [facility]) injection 4 mg  4 mg Intravenous Q6H PRN MD Kathleen Rocha ON 10/12/2018] enoxaparin (LOVENOX) injection 40 mg  40 mg Subcutaneous Daily Manolo Milligan MD        lactated ringers infusion   Intravenous Continuous Manolo Milligan MD        morphine (PF) injection 2 mg  2 mg Intravenous Q2H PRN Manolo Milligan MD        Or    morphine (PF) injection 4 mg  4 mg Intravenous Q2H PRN Manolo Milligan MD        ciprofloxacin (CIPRO) IVPB 400 mg  400 mg Intravenous Q12H Manolo Milligan MD        metronidazole (FLAGYL) 500 mg in NaCl 100 mL IVPB premix  500 mg Intravenous Bill Coyne  mL/hr at 10/11/18 1138 500 mg at 10/11/18 1138    fentaNYL (SUBLIMAZE) injection 25 mcg  25

## 2018-10-11 NOTE — ED NOTES
Discharge, medications, and follow-up instructions reviewed with patient. Pt questions/concerns answered. Pt verbalized understanding.         Yao Lovett RN  10/11/18 [id number]

## 2018-10-11 NOTE — ED PROVIDER NOTES
ED Attending  CC: No    HPI:  10/11/18,   Time: 10:02 AM         Eduardo Coulter is a 43 y.o. male presenting to the ED for RLQ pain, beginning 1 day ago. The complaint has been intermittent, moderate in severity, and worsened by nothing. Mr. Clarita Escalante was seen here overnight for right lower quadrant pain by Dr. Molly Brizuela. He states this pain began around 1 PM yesterday afternoon. He had associated nausea and vomiting at the time as well. Workup here in the emergency room included labs fluids and a CAT scan of the Abd/pelvis. On preliminary reading the report indicated no acute findings, however on reread this morning by our radiologists they did feel that there was stranding and inflammatory changes to suggest early appendicitis. Patient was advised to come back to the emergency room for further evaluation. He is telling me that he still has pain in his right lower quadrant. He's been nauseated but has not had any emesis this morning. He's had only a few sips of water and nothing else to eat this morning. ROS:     Constitutional: Negative for fever and chills  HENT: Negative for ear pain, sore throat and sinus pressure  Eyes: Negative for pain, discharge and redness  Respiratory:  Negative for shortness of breath, cough and wheezing  Cardiovascular: Negative for CP, edema or palpitations  Gastrointestinal: See HPI  Genitourinary: Negative for dysuria and frequency  Musculoskeletal: Negative for back pain and arthralgia  Skin: Negative for rash and wound  Neurological: Negative for weakness and headaches  Hematological: Negative for adenopathy    All other systems reviewed and are negative      --------------------------------------------- PAST HISTORY ---------------------------------------------  Past Medical History:  has no past medical history on file. Past Surgical History:  has a past surgical history that includes Anterior cruciate ligament repair (Right).     Social History:  reports that he has

## 2018-10-29 ENCOUNTER — OFFICE VISIT (OUTPATIENT)
Dept: SURGERY | Age: 38
End: 2018-10-29

## 2018-10-29 VITALS — SYSTOLIC BLOOD PRESSURE: 132 MMHG | HEART RATE: 80 BPM | DIASTOLIC BLOOD PRESSURE: 84 MMHG

## 2018-10-29 DIAGNOSIS — Z90.49 S/P LAPAROSCOPIC APPENDECTOMY: Primary | ICD-10-CM

## 2018-10-29 PROCEDURE — 99024 POSTOP FOLLOW-UP VISIT: CPT | Performed by: SURGERY

## 2022-07-28 ENCOUNTER — OFFICE VISIT (OUTPATIENT)
Dept: FAMILY MEDICINE CLINIC | Age: 42
End: 2022-07-28

## 2022-07-28 VITALS
BODY MASS INDEX: 35.49 KG/M2 | HEIGHT: 65 IN | DIASTOLIC BLOOD PRESSURE: 77 MMHG | TEMPERATURE: 98 F | OXYGEN SATURATION: 96 % | SYSTOLIC BLOOD PRESSURE: 127 MMHG | RESPIRATION RATE: 18 BRPM | WEIGHT: 213 LBS | HEART RATE: 58 BPM

## 2022-07-28 DIAGNOSIS — R73.03 PREDIABETES: Primary | ICD-10-CM

## 2022-07-28 DIAGNOSIS — R30.0 DYSURIA: ICD-10-CM

## 2022-07-28 LAB
BACTERIA: ABNORMAL /HPF
BILIRUBIN URINE: NEGATIVE
BILIRUBIN, POC: NEGATIVE
BLOOD URINE, POC: NORMAL
BLOOD, URINE: NORMAL
CLARITY, POC: CLEAR
CLARITY: CLEAR
COLOR, POC: NORMAL
COLOR: YELLOW
EPITHELIAL CELLS, UA: ABNORMAL /HPF
GLUCOSE URINE, POC: NEGATIVE
GLUCOSE URINE: NEGATIVE MG/DL
KETONES, POC: NEGATIVE
KETONES, URINE: NEGATIVE MG/DL
LEUKOCYTE EST, POC: NORMAL
LEUKOCYTE ESTERASE, URINE: NEGATIVE
MUCUS: PRESENT /LPF
NITRITE, POC: NEGATIVE
NITRITE, URINE: NEGATIVE
PH UA: 6 (ref 5–9)
PH, POC: 5.5
PROTEIN UA: NEGATIVE MG/DL
PROTEIN, POC: NEGATIVE
RBC UA: ABNORMAL /HPF (ref 0–2)
SPECIFIC GRAVITY UA: 1.02 (ref 1–1.03)
SPECIFIC GRAVITY, POC: 1.03
UROBILINOGEN, POC: 0.2
UROBILINOGEN, URINE: 0.2 E.U./DL
WBC UA: ABNORMAL /HPF (ref 0–5)

## 2022-07-28 PROCEDURE — 99203 OFFICE O/P NEW LOW 30 MIN: CPT | Performed by: FAMILY MEDICINE

## 2022-07-28 PROCEDURE — 81002 URINALYSIS NONAUTO W/O SCOPE: CPT | Performed by: FAMILY MEDICINE

## 2022-07-28 PROCEDURE — 99213 OFFICE O/P EST LOW 20 MIN: CPT | Performed by: FAMILY MEDICINE

## 2022-07-28 SDOH — ECONOMIC STABILITY: TRANSPORTATION INSECURITY
IN THE PAST 12 MONTHS, HAS LACK OF TRANSPORTATION KEPT YOU FROM MEETINGS, WORK, OR FROM GETTING THINGS NEEDED FOR DAILY LIVING?: NO

## 2022-07-28 SDOH — ECONOMIC STABILITY: FOOD INSECURITY: WITHIN THE PAST 12 MONTHS, YOU WORRIED THAT YOUR FOOD WOULD RUN OUT BEFORE YOU GOT MONEY TO BUY MORE.: NEVER TRUE

## 2022-07-28 SDOH — ECONOMIC STABILITY: FOOD INSECURITY: WITHIN THE PAST 12 MONTHS, THE FOOD YOU BOUGHT JUST DIDN'T LAST AND YOU DIDN'T HAVE MONEY TO GET MORE.: NEVER TRUE

## 2022-07-28 SDOH — ECONOMIC STABILITY: TRANSPORTATION INSECURITY
IN THE PAST 12 MONTHS, HAS THE LACK OF TRANSPORTATION KEPT YOU FROM MEDICAL APPOINTMENTS OR FROM GETTING MEDICATIONS?: NO

## 2022-07-28 ASSESSMENT — PATIENT HEALTH QUESTIONNAIRE - PHQ9
SUM OF ALL RESPONSES TO PHQ9 QUESTIONS 1 & 2: 0
1. LITTLE INTEREST OR PLEASURE IN DOING THINGS: 0
2. FEELING DOWN, DEPRESSED OR HOPELESS: 0
SUM OF ALL RESPONSES TO PHQ QUESTIONS 1-9: 0

## 2022-07-28 ASSESSMENT — LIFESTYLE VARIABLES
HOW OFTEN DO YOU HAVE A DRINK CONTAINING ALCOHOL: MONTHLY OR LESS
HOW MANY STANDARD DRINKS CONTAINING ALCOHOL DO YOU HAVE ON A TYPICAL DAY: 1 OR 2

## 2022-07-28 ASSESSMENT — SOCIAL DETERMINANTS OF HEALTH (SDOH): HOW HARD IS IT FOR YOU TO PAY FOR THE VERY BASICS LIKE FOOD, HOUSING, MEDICAL CARE, AND HEATING?: NOT HARD AT ALL

## 2022-07-28 NOTE — ASSESSMENT & PLAN NOTE
DDx: UTI vs Stone vs STI. Given resolution of symptoms, do not think needs a CT at this time. No sign of obstruction. Would recommend completing labs, if recurs, consider ER visit.

## 2022-07-28 NOTE — ASSESSMENT & PLAN NOTE
a1c elevated 2018  Recheck a1c  rec weight loss. Uninsured so will hold off on labs now per patient.

## 2022-07-28 NOTE — PROGRESS NOTES
Omar Villegas is a 36 y.o. male who presents today for   Chief Complaint   Patient presents with    Establish Care     Had burning on urination a few days ago       HPI: 45yo male here to re-establish Reports 3 days of pain w/ burning with urination that lasted all day 3 days ago. He has been urinating OK since then without any pain. He did a notice a different colored tint to the urine but did not seen blood. He didn't see any other symptoms with it. He reports some vague abdominal discomfort with it, no back/flank pain. Pain 7/10. Drank increased fluids that seemed to have helped. Doesn't report being dehydrated. Reports having in the past and was thought to be kidney stones. Did recall a small amount of solid similar to a seed that he passed and then noticed blood tinge after that. Past Medical History:   Diagnosis Date    ACL injury tear     2004       No current outpatient medications    No Known Allergies    Family History   Problem Relation Age of Onset    Asthma Mother     Diabetes Mother     No Known Problems Sister     No Known Problems Brother     No Known Problems Brother     No Known Problems Brother     High Blood Pressure Mother     High Cholesterol Mother     Diabetes Father        Past Surgical History:   Procedure Laterality Date    ANTERIOR CRUCIATE LIGAMENT REPAIR      Dr Georgina Arreaga Right     Right knee    ME LAP,APPENDECTOMY N/A 10/11/2018    LAPAROSCOPIC APPENDECTOMY performed by Rick Mir MD at [address] History     Tobacco Use    Smoking status: Some Days     Types: Cigarettes    Smokeless tobacco: Never   Substance Use Topics    Alcohol use: Yes     Comment: ocassionally    Drug use: No     Types: Marijuana (Weed)     Comment: back in high school       ROS:     No fevers / chills / shivering / sweats. No temperatures. No cough/shortness of breath.  + dysuria. No constipation. Now Urinating and stooling without problem. Physical Exam    Gen: NAD, AAOX3  HEENT: NC/AT, EOMI / PERRL / ANICTERIC, No Facial Droop. OP Clear, No obvious thyromegaly. CV: NDPMI, RRR no MRG  RESP: no IWOB, ctab. no w/r. ABD: +BS, SOFT, NT, ND. No rebound / guarding. No flank TTP. No abdominal pain. Appy scasr well healed. No inguinal hernia, testes without pain, uncircumcised male w/o lesions. No discharge. EXT: CRUZ Equally grossly. No LE Edema. 2+ Radial and DP Pulses. SKIN: Warm / Dry. Mood/ Affect: Pleasant & cooperative. No visits with results within 13 Month(s) from this visit.    Latest known visit with results is:   Admission on 10/10/2018, Discharged on 10/11/2018   Component Date Value Ref Range Status    Color, UA 10/10/2018 Yellow  Straw/Yellow Final    Clarity, UA 10/10/2018 Clear  Clear Final    Glucose, Ur 10/10/2018 Negative  Negative mg/dL Final    Bilirubin Urine 10/10/2018 Negative  Negative Final    Ketones, Urine 10/10/2018 Negative  Negative mg/dL Final    Specific Gravity, UA 10/10/2018 >=1.030  1.005 - 1.030 Final    Blood, Urine 10/10/2018 TRACE (A) Negative Final    pH, UA 10/10/2018 6.0  5.0 - 9.0 Final    Protein, UA 10/10/2018 Negative  Negative mg/dL Final    Urobilinogen, Urine 10/10/2018 0.2  <2.0 E.U./dL Final    Nitrite, Urine 10/10/2018 Negative  Negative Final    Leukocyte Esterase, Urine 10/10/2018 Negative  Negative Final    Lactic Acid 10/10/2018 1.0  0.5 - 2.2 mmol/L Final    WBC 10/10/2018 11.8 (A) 4.5 - 11.5 E9/L Final    RBC 10/10/2018 4.88  3.80 - 5.80 E12/L Final    Hemoglobin 10/10/2018 14.8  12.5 - 16.5 g/dL Final    Hematocrit 10/10/2018 43.2  37.0 - 54.0 % Final    MCV 10/10/2018 88.5  80.0 - 99.9 fL Final    MCH 10/10/2018 30.3  26.0 - 35.0 pg Final    MCHC 10/10/2018 34.3  32.0 - 34.5 % Final    RDW 10/10/2018 11.9  11.5 - 15.0 fL Final    Platelets 27/09/6135 175  130 - 450 E9/L Final    MPV 10/10/2018 11.6  7.0 - 12.0 fL Final    Neutrophils % 10/10/2018 76.0  43.0 - 80.0 % Final Immature Granulocytes % 10/10/2018 0.4  0.0 - 5.0 % Final    Lymphocytes % 10/10/2018 14.7 (A) 20.0 - 42.0 % Final    Monocytes % 10/10/2018 7.9  2.0 - 12.0 % Final    Eosinophils % 10/10/2018 0.7  0.0 - 6.0 % Final    Basophils % 10/10/2018 0.3  0.0 - 2.0 % Final    Neutrophils Absolute 10/10/2018 8.99 (A) 1.80 - 7.30 E9/L Final    Immature Granulocytes # 10/10/2018 0.05  E9/L Final    Lymphocytes Absolute 10/10/2018 1.74  1.50 - 4.00 E9/L Final    Monocytes Absolute 10/10/2018 0.94  0.10 - 0.95 E9/L Final    Eosinophils Absolute 10/10/2018 0.08  0.05 - 0.50 E9/L Final    Basophils Absolute 10/10/2018 0.04  0.00 - 0.20 E9/L Final    Sodium 10/10/2018 140  132 - 146 mmol/L Final    Potassium 10/10/2018 3.9  3.5 - 5.0 mmol/L Final    Chloride 10/10/2018 102  98 - 107 mmol/L Final    CO2 10/10/2018 23  22 - 29 mmol/L Final    Anion Gap 10/10/2018 15  7 - 16 mmol/L Final    Glucose 10/10/2018 111 (A) 74 - 109 mg/dL Final    BUN 10/10/2018 23 (A) 6 - 20 mg/dL Final    Creatinine 10/10/2018 0.7  0.7 - 1.2 mg/dL Final    GFR Non- 10/10/2018 >60  >=60 mL/min/1.73 Final    GFR  10/10/2018 >60   Final    Calcium 10/10/2018 9.8  8.6 - 10.2 mg/dL Final    Total Protein 10/10/2018 7.7  6.4 - 8.3 g/dL Final    Albumin 10/10/2018 4.7  3.5 - 5.2 g/dL Final    Total Bilirubin 10/10/2018 0.9  0.0 - 1.2 mg/dL Final    Alkaline Phosphatase 10/10/2018 90  40 - 129 U/L Final    ALT 10/10/2018 79 (A) 0 - 40 U/L Final    AST 10/10/2018 41 (A) 0 - 39 U/L Final    WBC, UA 10/10/2018 NONE  0 - 5 /HPF Final    RBC, UA 10/10/2018 0-1  0 - 2 /HPF Final    Epithelial Cells, UA 10/10/2018 MODERATE  /HPF Final    Bacteria, UA 10/10/2018 NONE  /HPF Final         A/P:    Dysuria  DDx: UTI vs Stone vs STI. Given resolution of symptoms, do not think needs a CT at this time. No sign of obstruction. Recalls passing a small seed shaped stone. Would recommend completing labs, if recurs, consider ER visit. Prediabetes   a1c elevated 2018  Recheck a1c  rec weight loss. Uninsured so will hold off on labs now per patient. Additional plan and future considerations[facility]. Health risk factors discussed and addressed. Please see Patient Instructions for further counseling and information given. Advised to be adherent to the treatment plans discussed today, and please call with any questions or concerns, letting the office know of any reasons that the plans may not be followed. The risks of untreated conditions include worsening illness, injury, disability, and possibly, death. Please call if symptoms change in any way, worsen, or fail to completely resolve, as this could necessitate a change to treatment plans. Patient and/or caregiver expressed understanding. Indications and proper use of medication(s) reviewed. Potential side-effects, and risks of medication(s) also explained. Patient and/or caregiver was instructed to call if any new symptoms develop prior to next visit. Meg Ventura MD  07/28/22  Video randi jurado used.

## 2022-07-29 ENCOUNTER — TELEPHONE (OUTPATIENT)
Dept: FAMILY MEDICINE CLINIC | Age: 42
End: 2022-07-29

## 2022-07-29 NOTE — TELEPHONE ENCOUNTER
LSW attempted phone call to patient via [facility]  Americavidal Ferreira #[phone]. No answer, outgoing message stated person has voicemail that has not been set up, try call again later.  attempted call again,  still no answer.

## 2022-07-30 LAB — URINE CULTURE, ROUTINE: NORMAL

## 2022-08-01 NOTE — TELEPHONE ENCOUNTER
Thanks - do we have anything we can mail him in [address]? He does understand limited english but Swedish would be preferable.  Thanks castro

## (undated) DEVICE — GLOVE ORANGE PI 7 1/2   MSG9075

## (undated) DEVICE — GARMENT,MEDLINE,DVT,INT,CALF,MED, GEN2: Brand: MEDLINE

## (undated) DEVICE — CUTTER ENDOSCP L340MM LIN ARTC SGL STROKE FIRING ENDOPATH

## (undated) DEVICE — PENCIL ES L3M BTTN SWCH HOLSTER W/ BLDE ELECTRD EDGE

## (undated) DEVICE — DOUBLE BASIN SET: Brand: MEDLINE INDUSTRIES, INC.

## (undated) DEVICE — Z DISCONTINUED NO SUB IDED BAG SPEC RETRV M C240ML MOUTH 7.3MM L17CM SHFT 10MM NYL EZEE

## (undated) DEVICE — TROCAR ENDOSCP L100MM DIA5MM BLDELSS STBL SL OBT RADLUC

## (undated) DEVICE — PLUMEPORT LAPAROSCOPIC SMOKE FILTRATION DEVICE: Brand: PLUMEPORT ACTIV

## (undated) DEVICE — SET ENDO INSTR LAPAROSCOPIC INCISIONAL

## (undated) DEVICE — PMI PTFE COATED LAPAROSCOPIC WIRE L-HOOK 33 CM: Brand: PMI

## (undated) DEVICE — CAMERA STRYKER 1488 HD GEN

## (undated) DEVICE — STANDARD HYPODERMIC NEEDLE,POLYPROPYLENE HUB: Brand: MONOJECT

## (undated) DEVICE — TROCAR ENDOSCP L100MM DIA12MM BLDELSS OBT RADLUC STBL SL

## (undated) DEVICE — GOWN,SIRUS,NONRNF,SETINSLV,XL,20/CS: Brand: MEDLINE

## (undated) DEVICE — PATIENT RETURN ELECTRODE, SINGLE-USE, CONTACT QUALITY MONITORING, ADULT, WITH 9FT CORD, FOR PATIENTS WEIGING OVER 33LBS. (15KG): Brand: MEGADYNE

## (undated) DEVICE — CONTROL SYRINGE LUER-LOCK TIP: Brand: MONOJECT

## (undated) DEVICE — APPLIER CLP M/L SHFT DIA5MM 15 LIG LIGAMAX 5

## (undated) DEVICE — RELOAD STPL SZ 0 L45MM DIA3.5MM 0DEG STD REG TISS BLU TI

## (undated) DEVICE — PACK SURG LAP CHOLE CUSTOM

## (undated) DEVICE — [HIGH FLOW INSUFFLATOR,  DO NOT USE IF PACKAGE IS DAMAGED,  KEEP DRY,  KEEP AWAY FROM SUNLIGHT,  PROTECT FROM HEAT AND RADIOACTIVE SOURCES.]: Brand: PNEUMOSURE

## (undated) DEVICE — NDL CNTR 40CT FM MAG: Brand: MEDLINE INDUSTRIES, INC.

## (undated) DEVICE — CHLORAPREP 26ML ORANGE

## (undated) DEVICE — MARKER,SKIN,WI/RULER AND LABELS: Brand: MEDLINE

## (undated) DEVICE — RELOAD STPL H1-2.5X45MM VASC THN TISS WHT 6 ROW B FRM SGL